# Patient Record
Sex: FEMALE | Race: WHITE | NOT HISPANIC OR LATINO | Employment: FULL TIME | URBAN - METROPOLITAN AREA
[De-identification: names, ages, dates, MRNs, and addresses within clinical notes are randomized per-mention and may not be internally consistent; named-entity substitution may affect disease eponyms.]

---

## 2023-09-02 ENCOUNTER — OFFICE VISIT (OUTPATIENT)
Dept: FAMILY MEDICINE | Facility: CLINIC | Age: 53
End: 2023-09-02
Payer: COMMERCIAL

## 2023-09-02 VITALS
RESPIRATION RATE: 14 BRPM | TEMPERATURE: 98.9 F | OXYGEN SATURATION: 96 % | HEART RATE: 88 BPM | SYSTOLIC BLOOD PRESSURE: 130 MMHG | DIASTOLIC BLOOD PRESSURE: 81 MMHG | WEIGHT: 179 LBS

## 2023-09-02 DIAGNOSIS — Z86.711 HISTORY OF PULMONARY EMBOLISM: ICD-10-CM

## 2023-09-02 DIAGNOSIS — L03.115 CELLULITIS OF RIGHT LOWER EXTREMITY: Primary | ICD-10-CM

## 2023-09-02 PROBLEM — Z79.01 LONG TERM CURRENT USE OF ANTICOAGULANT: Status: ACTIVE | Noted: 2021-10-20

## 2023-09-02 LAB — D DIMER PPP FEU-MCNC: 0.77 UG/ML FEU (ref 0–0.5)

## 2023-09-02 PROCEDURE — 36415 COLL VENOUS BLD VENIPUNCTURE: CPT | Performed by: NURSE PRACTITIONER

## 2023-09-02 PROCEDURE — 85379 FIBRIN DEGRADATION QUANT: CPT | Performed by: NURSE PRACTITIONER

## 2023-09-02 PROCEDURE — 99203 OFFICE O/P NEW LOW 30 MIN: CPT | Performed by: NURSE PRACTITIONER

## 2023-09-02 RX ORDER — RIVAROXABAN 20 MG/1
1 TABLET, FILM COATED ORAL DAILY
COMMUNITY
Start: 2023-07-31

## 2023-09-02 RX ORDER — CEPHALEXIN 500 MG/1
500 CAPSULE ORAL 4 TIMES DAILY
Qty: 40 CAPSULE | Refills: 0 | Status: SHIPPED | OUTPATIENT
Start: 2023-09-02 | End: 2023-09-12

## 2023-09-02 RX ORDER — CHOLECALCIFEROL (VITAMIN D3) 50 MCG
4000 TABLET ORAL
COMMUNITY

## 2023-09-02 NOTE — PATIENT INSTRUCTIONS
Monitor outlined area for expansion.  Okay for mild expansion outside of the lines within the first 24 hours, but after that wound should not be expanding.  Any abrupt increase in area of pinkness or fever, chills should prompt a visit to the emergency room or back here in urgent care.  Recheck in about 3 days if infection not getting any better.    I did a screening lab for clots called a D-dimer.  It is very unlikely that the redness on her leg is being caused by clot based on the location and since you are already on Xarelto.  However, if this is elevated, somebody will call you and then I would recommend going to emergency room tonight to get an ultrasound of your leg to rule out a clot.

## 2023-09-02 NOTE — PROGRESS NOTES
Assessment & Plan     Cellulitis of right lower extremity    - D dimer quantitative  - cephALEXin (KEFLEX) 500 MG capsule  Dispense: 40 capsule; Refill: 0    History of pulmonary embolism       3 to 4 days of evolving right lower extremity redness starting around the area of a blister on the heel and migrating upward.  No calf tenderness, but does have a history of PEs, unprovoked, and is on Xarelto, so we will do a screening D-dimer.  If normal, no further action is needed.  Low suspicion for DVT.    Otherwise, if D-dimer is elevated, do recommend ultrasound to screen for DVT tonight the emergency room after urgent care is closed.    Given instruction to monitor outlined area for expansion.  Okay for mild expansion outside of the lines within the first 24 hours, but after that wound should not be expanding.  Any abrupt increase in area of erythema or fever, chills should prompt a visit to the emergency room or urgent care.                Return in about 3 days (around 9/5/2023) for If no better.    Gris Ron, [facility]    Navi Ricardo is a 52 year old female who presents to clinic today for the following health issues:  Chief Complaint   Patient presents with    Foot Problems     Several days ago noticed swelling in rt foot also had small red spot bu heel of foot  now has rash on  rt ankle      HPI    4 days of worsening erythema starting around the area of the heel blister in the heel of the right foot tracking upwards over the last 3 to 4 days with tenderness over the area of erythema.  No history of cellulitis in the past.    Not itchy.  No significant calf tenderness nor systemic symptoms such as fever.    Does have a history of Pes, unprovoked about 3 to 4 years ago.  Follows with hematology.  Has an appointment coming up soon discuss ongoing need for Xarelto.  Is on Xarelto.  Has been taking regularly.  Has not been missing doses.      Says she received  D-dimer tests to help track the clot burden and said they had returned to normal.          Review of Systems    See HPI        Objective    /81   Pulse 88   Temp 98.9  F (37.2  C) (Tympanic)   Resp 14   Wt 81.2 kg (179 lb)   SpO2 96%   Physical Exam  Constitutional:       Appearance: Normal appearance.   Pulmonary:      Effort: Pulmonary effort is normal.   Musculoskeletal:         General: Tenderness (Over area of redness only.  No tenderness in the calf itself or outside of the area of redness.) present.   Skin:     Findings: Erythema (Right medial foot and leg originating at the right heel area of erythema, tenderness, light pink 25 cm x 11 cm wide, irregular with nondistinct borders.  General area of tenderness and erythema located on the rt lateral foot triangle shaped 11 cm x 7 cm.) present.   Neurological:      Mental Status: She is alert.   Psychiatric:         Mood and Affect: Mood normal.

## 2023-09-03 ENCOUNTER — OFFICE VISIT (OUTPATIENT)
Dept: FAMILY MEDICINE | Facility: CLINIC | Age: 53
End: 2023-09-03
Payer: COMMERCIAL

## 2023-09-03 ENCOUNTER — HOSPITAL ENCOUNTER (OUTPATIENT)
Dept: ULTRASOUND IMAGING | Facility: HOSPITAL | Age: 53
Discharge: HOME | End: 2023-09-03
Attending: NURSE PRACTITIONER | Admitting: NURSE PRACTITIONER
Payer: COMMERCIAL

## 2023-09-03 VITALS
TEMPERATURE: 98.3 F | HEART RATE: 76 BPM | OXYGEN SATURATION: 99 % | DIASTOLIC BLOOD PRESSURE: 70 MMHG | SYSTOLIC BLOOD PRESSURE: 128 MMHG | WEIGHT: 179 LBS | RESPIRATION RATE: 14 BRPM

## 2023-09-03 DIAGNOSIS — L03.115 CELLULITIS OF RIGHT LOWER EXTREMITY: ICD-10-CM

## 2023-09-03 DIAGNOSIS — Z86.711 HISTORY OF PULMONARY EMBOLISM: ICD-10-CM

## 2023-09-03 DIAGNOSIS — M79.604 PAIN OF RIGHT LOWER EXTREMITY: ICD-10-CM

## 2023-09-03 DIAGNOSIS — R79.89 ELEVATED D-DIMER: Primary | ICD-10-CM

## 2023-09-03 PROCEDURE — 99213 OFFICE O/P EST LOW 20 MIN: CPT | Performed by: NURSE PRACTITIONER

## 2023-09-03 PROCEDURE — 93971 EXTREMITY STUDY: CPT | Mod: RT

## 2023-09-03 ASSESSMENT — ENCOUNTER SYMPTOMS
FEVER: 0
CHILLS: 0

## 2023-09-03 NOTE — RESULT ENCOUNTER NOTE
Patient called w result.. will come in tomorrow to [facility] for u/s. Optional ER visit tonight discussed.

## 2023-09-03 NOTE — PROGRESS NOTES
Assessment & Plan     Pain of right lower extremity    - US Lower Extremity Venous Duplex Right    Cellulitis of right lower extremity    - US Lower Extremity Venous Duplex Right    History of pulmonary embolism    - US Lower Extremity Venous Duplex Right    Elevated d-dimer       Patient with history of PEs and elevated D-dimer associated with new right lower extremity cellulitis diagnosed yesterday.  As we were closed when her D-dimer came back, recommended returning today for recheck in ultrasound to check for DVT.  Ultrasound done here was negative with reactive right-sided lymph node in the groin.    Cellulitis is actually looking a lot better already.     Recommended assuming continues to improve stopping Keflex after 7 days rather than 10.              No follow-ups on file.    [facility] KATHARINE Ricardo is a 52 year old female who presents to clinic today for the following health issues:  Chief Complaint   Patient presents with    Leg Swelling     Follow up right lower leg swelling and rash ultrasound done today.      HPI    I saw patient yesterday and had a D-dimer come back elevated after close.  Patient has a history of PEs and is on Xarelto.  Had developed a right lower extremity cellulitis today started Keflex treatment.  Having calf and leg pain associated with this.    Recommended DVT rule out despite use of Xarelto religiously.  DVT ultrasound here was negative prior to being seen.          Review of Systems   Constitutional:  Negative for chills and fever.           Objective    /70   Pulse 76   Temp 98.3  F (36.8  C)   Resp 14   Wt 81.2 kg (179 lb)   SpO2 99%   Physical Exam  Constitutional:       Appearance: Normal appearance.   Cardiovascular:      Pulses: Normal pulses.   Skin:     Findings: Erythema (Cellulitis area marked in the right lower extremity yesterday is probably 80% to 90% improved.  Residual swelling around the right  medial ankle.) present.   Neurological:      Mental Status: She is alert.   Psychiatric:         Mood and Affect: Mood normal.            Results for orders placed or performed during the hospital encounter of 09/03/23   US Lower Extremity Venous Duplex Right     Status: None    Narrative    EXAM: US LOWER EXTREMITY VENOUS DUPLEX RIGHT  LOCATION: [facility]  DATE: 9/3/2023    INDICATION: Hx of PEs with RLE cellulitis and leg pain.  COMPARISON: None.  TECHNIQUE: Venous Duplex ultrasound of the right lower extremity with and without compression, augmentation and duplex. Color flow and spectral Doppler with waveform analysis performed.    FINDINGS: Exam includes the common femoral, femoral, popliteal, and contralateral common femoral veins as well as segmentally visualized deep calf veins and greater saphenous vein.     RIGHT: No deep vein thrombosis. No superficial thrombophlebitis. No popliteal cyst. Enlarged lymph node noted in the right groin measuring 2.4 x 1 x 2.1 cm. Fatty hilum is present.      Impression    IMPRESSION:  1.  No deep venous thrombosis in the right lower extremity.  2.  Enlarged lymph node in the right groin, may be reactive, though nonspecific.         Results for orders placed or performed in visit on 09/02/23 (from the past 24 hour(s))   D dimer quantitative   Result Value Ref Range    D-Dimer Quantitative 0.77 (H) 0.00 - 0.50 ug/mL FEU    Narrative    This D-dimer assay is intended for use in conjunction with a clinical pretest probability assessment model to exclude pulmonary embolism (PE) and deep venous thrombosis (DVT) in outpatients suspected of PE or DVT. The cut-off value is 0.50 ug/mL FEU.

## 2023-10-22 ENCOUNTER — HEALTH MAINTENANCE LETTER (OUTPATIENT)
Age: 53
End: 2023-10-22

## 2024-03-10 ENCOUNTER — HEALTH MAINTENANCE LETTER (OUTPATIENT)
Age: 54
End: 2024-03-10

## 2024-12-15 ENCOUNTER — HEALTH MAINTENANCE LETTER (OUTPATIENT)
Age: 54
End: 2024-12-15

## 2025-02-28 ENCOUNTER — TRANSFERRED RECORDS (OUTPATIENT)
Dept: HEALTH INFORMATION MANAGEMENT | Facility: CLINIC | Age: 55
End: 2025-02-28
Payer: COMMERCIAL

## 2025-03-13 ENCOUNTER — OFFICE VISIT (OUTPATIENT)
Dept: PODIATRY | Facility: CLINIC | Age: 55
End: 2025-03-13
Payer: COMMERCIAL

## 2025-03-13 VITALS — WEIGHT: 179 LBS

## 2025-03-13 DIAGNOSIS — M20.62 TOE DEFORMITY, LEFT: ICD-10-CM

## 2025-03-13 DIAGNOSIS — M20.12 HALLUX ABDUCTOVALGUS, LEFT: ICD-10-CM

## 2025-03-13 DIAGNOSIS — M77.42 METATARSALGIA, LEFT FOOT: Primary | ICD-10-CM

## 2025-03-13 NOTE — PATIENT INSTRUCTIONS
Thank you for choosing [facility]!    DR. IRVIN'S CLINIC LOCATIONS:     [facility] TRIAGE LINE: [phone]   [address] [place] [address] APPOINTMENTS: [phone]   [place] [address] RADIOLOGY: [phone]   (Every other Tues - Wed - Fri PM) SET UP SURGERY: [phone]    PHYSICAL THERAPY: [phone]   [place] SPECIALTY BILLING QUESTIONS: [phone] [address] FAX: [phone]   [place] [address]    (Thurs & Fri AM)         CAPSULITIS / METATARSALGIA  All joints in the body are surrounded by a capsule, or a covering of soft tissue and ligaments. The capsule holds bones together and secretes joint fluid to help lubricate the joint. If a joint capsule is exposed to excessive force, it can develop microscopic tears and become inflamed. This commonly occurs in the foot due to mild variation in anatomy. Hammertoes, bunions, irregular bone length, joint immobility, etc. can all lead to excessive force on the joint. Capsule injury can also occur due to repetitive stress from exercise, insufficient support from shoes, excessive bare foot walking and excessive weight.      Conservative treatments include ice, rest from the aggravating activity, weight loss, orthotic inserts, improving shoes and shoe modifications. You can purchase an over the counter felt metatarsal pad to place in your shoes at [facility] or on [facility]. Appropriate shoes will protect the inflamed tissue improving the chances of healing. Avoidance of standing or walking barefoot, including around the house, is necessary to allow healing. Casts are sometimes used for more aggressive protection.  NSAIDs such as Advil are also used to help with pain and decreasing inflammation. If pain continues over a period of weeks with continuous rest and icing, Corticosteroid injections can be a treatment option to try and help decrease inflammation.    Surgery is often necessary to correct the underlying  "structural problem. Surgery might include shortening an excessively long bone, repairing bunion or hammertoe, lengthening a tight Achilles  tendon, etc. These are same day surgeries that might be pursued if more conservative measures fail to provide relief.      The inflamed joint capsule has the potential to completely tear. This will allow the toe to drift off the ground, curving toward the other toes. The involved toe may under or overlap the adjacent toes as drift continues. The pain may improve after the joint tears or this new position will be permanent. Surgery can address the toe alignment. Your goal of treating capsulitis is to avoid this scenario.        ** You can find felt metatarsal pads to place in your shoes at our [facility], [employer], [employer], or on [employer]     Dr. Becerra likes the [employer] brand.    FOREFOOT PAIN RECOMMENDATIONS    1) Please consider stiffer/ rigid - soled shoes as much as possible. These take stress off of the ball of your foot. This might be short term, until pain resolves, or long term to keep pain controlled.    2) Avoid barefoot walking, walking in socks, flexible shoes, flip flops, shoes without arch support, etc.    3) It is a good idea to wear a shoe at all times, including when at home.    4) Consider purchasing a felt metatarsal pad.  A good brand is \"[employer]ad.\"  You can find these and others online.  Also, these can be built into custom/prescription arch supports.    5) Consider a quality over-the-counter arch support. These can be found at [employer].  Some of these have a metatarsal pad built in.   If Dr. Becerra prescribed custom orthoses, please consider this option.    6) Ice and anti inflammatories can be helpful, earlier on in the process.  Anti inflammatories are not good for you, if taken for a long period of time.     7) Gentle calf stretching can take pressure off of the ball of your foot.    Calf/Achilles Stretching: Do the stretching " gently. Do not bounce or stretch to the point of pain. Hold each stretch for 30 seconds. Stretch 10 times per set, three sets per day. Morning, afternoon and evening. If your heel pain is very severe in the morning, consider doing the first set of stretches before you get out of bed.               Hold each stretch for 30 seconds. Stretch 10 times per set, three sets per day. Morning, afternoon and evening. If your heel pain is very severe in the morning, consider doing the first set of stretches before you get out of bed.

## 2025-03-13 NOTE — PROGRESS NOTES
"ASSESSMENT:  Encounter Diagnoses   Name Primary?    Metatarsalgia, left foot Yes    Hallux abductovalgus, left     Toe deformity, left      MEDICAL DECISION MAKING:  I told Haleigh that I provide my opinion (second opinion), as if she had not seen someone else and she was presenting for the first time with the problem.    Although likely interrelated, her pain is not in the region of the bunion.  Her pain is consistent with metatarsalgia likely capsulitis of the third metatarsal phalangeal joint.    I recommend the focus of treatment be on offloading this area:  Stiffer soled shoes  Metatarsal padding  Gentle calf stretching  Ongoing use of orthoses  Avoidance of barefoot walking and nonsupportive footwear    Surgically, a Weil osteotomy of the third metatarsal might be beneficial.  I would be hesitant to proceed with any bunion-related procedure, given this part of her foot is not painful.  A gastrocnemius recession, to offload the left forefoot is certainly reasonable.  However she demonstrates normal dorsiflexion     She states that her pain is mainly at night, not so much with weightbearing activities.  I explained that the goal of surgery is pain reduction.  It does not necessarily equate to a \"normal \"foot.    I encouraged her to continue ongoing efforts to manage this conservatively.    Follow-up on an as-needed basis.    Disclaimer: This note consists of symbols derived from keyboarding, dictation and/or voice recognition software. As a result, there may be errors in the script that have gone undetected. Please consider this when interpreting information found in this chart.    Floyd Becerra DPM, FACFAS, MS    [place] [facility] Pod[facility]/Foot & Ankle Surgery      ____________________________________________________________________    HPI:       Haleigh Lisa presents today for evaluation of a bunion, left foot.  She is seeking a second opinion on options for this.  2 years of pain  Aching and throbbing " rated 7 out of 10 at worst  She has used custom orthoses and done some stretching.  Walking is her main form of exercise.    She specifies pain being under the plantar forefoot, near the third toe.  She was evaluated outside of [place] and several surgical options were discussed including surgery for her bunion, as well as lengthening calf tissues.    *No past medical history on file.*  *No past surgical history on file.*  *  Current Outpatient Medications   Medication Sig Dispense Refill    vitamin D3 (CHOLECALCIFEROL) 50 mcg (2000 units) tablet Take 4,000 Units by mouth      XARELTO ANTICOAGULANT 20 MG TABS tablet Take 1 tablet by mouth daily           EXAM:    Vitals: There were no vitals taken for this visit.  BMI: There is no height or weight on file to calculate BMI.    Vasc:      Pedal pulses are palpable for the dorsalis pedis posterior tibial artery, bilateral foot.  Capillary fill time </= 3 seconds  Pedal skin appears well-perfused  Neuro:      Light touch sensation intact to all sensory nerve distributions, bilateral foot.  No apparent spastic contractures or other deformity secondary to neurologic compromise.  Derm:      Mild hyperkeratosis below the left third metatarsal head region.  No wounds   No worrisome lesions  MSK:      Hallux abductovalgus on the left.  This is not fully reducible in the transverse plane.  With loading of the left forefoot, abnormal limitation in hallux dorsiflexion.  Lesser toes are crowded with the second toe partially overriding the third.  She is able to dorsiflex her left ankle beyond neutral position.  Bilateral lower extremity muscle strength presents is normal.  Adequate ankle and subtalar joint range of motion  Calf:    Neg for redness, swelling or tenderness

## 2025-03-13 NOTE — LETTER
"3/13/2025      Haleigh Lisa  [address] [place] [address]      Dear Colleague,    Thank you for referring your patient, Haleigh Lisa, to the [facility]. Please see a copy of my visit note below.    ASSESSMENT:  Encounter Diagnoses   Name Primary?     Metatarsalgia, left foot Yes     Hallux abductovalgus, left      Toe deformity, left      MEDICAL DECISION MAKING:  I told Haleigh that I provide my opinion (second opinion), as if she had not seen someone else and she was presenting for the first time with the problem.    Although likely interrelated, her pain is not in the region of the bunion.  Her pain is consistent with metatarsalgia likely capsulitis of the third metatarsal phalangeal joint.    I recommend the focus of treatment be on offloading this area:  Stiffer soled shoes  Metatarsal padding  Gentle calf stretching  Ongoing use of orthoses  Avoidance of barefoot walking and nonsupportive footwear    Surgically, a Weil osteotomy of the third metatarsal might be beneficial.  I would be hesitant to proceed with any bunion-related procedure, given this part of her foot is not painful.  A gastrocnemius recession, to offload the left forefoot is certainly reasonable.  However she demonstrates normal dorsiflexion     She states that her pain is mainly at night, not so much with weightbearing activities.  I explained that the goal of surgery is pain reduction.  It does not necessarily equate to a \"normal \"foot.    I encouraged her to continue ongoing efforts to manage this conservatively.    Follow-up on an as-needed basis.    Disclaimer: This note consists of symbols derived from keyboarding, dictation and/or voice recognition software. As a result, there may be errors in the script that have gone undetected. Please consider this when interpreting information found in this chart.    Floyd Becerra DPM, FACFAS, MS    [place] [facility] of Podiatry/Foot & Ankle " Surgery      ____________________________________________________________________    HPI:       Haleigh Lisa presents today for evaluation of a bunion, left foot.  She is seeking a second opinion on options for this.  2 years of pain  Aching and throbbing rated 7 out of 10 at worst  She has used custom orthoses and done some stretching.  Walking is her main form of exercise.    She specifies pain being under the plantar forefoot, near the third toe.  She was evaluated outside of [place] and several surgical options were discussed including surgery for her bunion, as well as lengthening calf tissues.    *No past medical history on file.*  *No past surgical history on file.*  *  Current Outpatient Medications   Medication Sig Dispense Refill     vitamin D3 (CHOLECALCIFEROL) 50 mcg (2000 units) tablet Take 4,000 Units by mouth       XARELTO ANTICOAGULANT 20 MG TABS tablet Take 1 tablet by mouth daily           EXAM:    Vitals: There were no vitals taken for this visit.  BMI: There is no height or weight on file to calculate BMI.    Vasc:      Pedal pulses are palpable for the dorsalis pedis posterior tibial artery, bilateral foot.  Capillary fill time </= 3 seconds  Pedal skin appears well-perfused  Neuro:      Light touch sensation intact to all sensory nerve distributions, bilateral foot.  No apparent spastic contractures or other deformity secondary to neurologic compromise.  Derm:      Mild hyperkeratosis below the left third metatarsal head region.  No wounds   No worrisome lesions  MSK:      Hallux abductovalgus on the left.  This is not fully reducible in the transverse plane.  With loading of the left forefoot, abnormal limitation in hallux dorsiflexion.  Lesser toes are crowded with the second toe partially overriding the third.  She is able to dorsiflex her left ankle beyond neutral position.  Bilateral lower extremity muscle strength presents is normal.  Adequate ankle and subtalar joint range of  motion  Calf:    Neg for redness, swelling or tenderness      Again, thank you for allowing me to participate in the care of your patient.        Sincerely,        Floyd Becerra DPM    Electronically signed